# Patient Record
Sex: FEMALE | Race: WHITE | NOT HISPANIC OR LATINO | Employment: FULL TIME | URBAN - METROPOLITAN AREA
[De-identification: names, ages, dates, MRNs, and addresses within clinical notes are randomized per-mention and may not be internally consistent; named-entity substitution may affect disease eponyms.]

---

## 2019-02-15 ENCOUNTER — GYNECOLOGY VISIT (OUTPATIENT)
Dept: OBGYN | Facility: CLINIC | Age: 34
End: 2019-02-15
Payer: MEDICAID

## 2019-02-15 VITALS
DIASTOLIC BLOOD PRESSURE: 82 MMHG | HEIGHT: 62 IN | BODY MASS INDEX: 29.26 KG/M2 | SYSTOLIC BLOOD PRESSURE: 128 MMHG | WEIGHT: 159 LBS

## 2019-02-15 DIAGNOSIS — Z01.419 WELL WOMAN EXAM: ICD-10-CM

## 2019-02-15 PROCEDURE — 99204 OFFICE O/P NEW MOD 45 MIN: CPT | Performed by: NURSE PRACTITIONER

## 2019-02-15 RX ORDER — MEDROXYPROGESTERONE ACETATE 150 MG/ML
150 INJECTION, SUSPENSION INTRAMUSCULAR ONCE
Qty: 1 VIAL | Refills: 0 | Status: SHIPPED | OUTPATIENT
Start: 2019-02-15 | End: 2019-02-15

## 2019-02-15 ASSESSMENT — ENCOUNTER SYMPTOMS
CONSTITUTIONAL NEGATIVE: 1
PSYCHIATRIC NEGATIVE: 1
MUSCULOSKELETAL NEGATIVE: 1
RESPIRATORY NEGATIVE: 1
CARDIOVASCULAR NEGATIVE: 1
GASTROINTESTINAL NEGATIVE: 1
NEUROLOGICAL NEGATIVE: 1
EYES NEGATIVE: 1

## 2019-02-15 NOTE — PROGRESS NOTES
Subjective:      Taylor Hermosillo is a 33 y.o. female who presents with Other (Consult for MIrena IUD removal & Placement)            Taylor Hermosillo is a 33 y.o. female who presents for her Gynecologic Exam.         HPI Comments: Pt presents for well woman exam. Pt has no complaints except some weight loss in past few months that she is unsure is related to her thyroid imbalance hx or her commuting on a bicycle. Last LMP several years ago, just random spotting. Is using Mirena IUD and reports it  this past month 19 as per the provider who placed it. She reports she would like it removed and another placed. She reports she had sex a few times unprotected in that first week of February but has since used condoms.     Review of Systems   Pertinent positives documented in HPI and all other systems reviewed & are negative    All PMH, PSH, allergies, social history and FH reviewed and updated today:  Reports hx of hashimoto's on levothyroxine but has discontinued use about 6 months ago because she was biking a lot as form of transportation and started losing too much weight and the medication was making her nauseous. Reports hx of losartan due to high blood pressure that happened due to her thyroid issues, as per patient.    Dental extraction and  deliveries x2  No allergies to medications, foods, environment.   Reports no alcohol use, no smoking cigarettes, no drug use. Reports one sexual male partner currently. Approximately 20 sexual partners in the past, all males.   Reports family hx of high blood pressure from father and maternal grandmother; kidney disease on maternal grandfather; thyroid issues with maternal grandmother and grandfather; reports mother has bleeding tendencies, and maternal grandfather has heart disease. Dementia and alzheimers also with mother and grandmother.   Medications: none       Objective:  Vital measurements:  128/82  159 lbs    Physical Exam     Chaperone offered:  declines     Nursing note and vitals reviewed.  Constitutional: She is oriented to person, place, and time. She appears well-developed and well-nourished. No distress.     HEENT:   Head: Normocephalic and atraumatic.   Right Ear: External ear normal.   Left Ear: External ear normal.   Nose: Nose normal.   Eyes: Conjunctivae and EOM are normal. Pupils are equal, round, and reactive to light. No scleral icterus.     Neck: Normal range of motion. Neck supple. No tracheal deviation present. No thyromegaly present.     Pulmonary/Chest: Effort normal and breath sounds normal. No respiratory distress. She has no wheezes. She has no rales. She exhibits no tenderness.     Cardiovascular: Regular, rate and rhythm. No JVD.    Abdominal: Soft. Bowel sounds are normal. She exhibits no distension and no mass. No tenderness. She has no rebound and no guarding.     Breast:  Symmetrical, normal consistency without masses., No dimpling or skin changes, negative, No masses    Genitourinary:  Pelvic exam was performed with patient supine.  External genitalia with no abnormal pigmentation, labial fusion,rash, tenderness, lesion or injury to the labia bilaterally.  Vagina is moist with no lesions, foul discharge, erythema, tenderness or bleeding. No foreign body around the vagina or signs of injury.   Cervix exhibits no motion tenderness, no discharge and no friability.   Uterus is normal not deviated, not enlarged, not fixed and not tender.  Right adnexum displays no mass, no tenderness and no fullness. Left adnexum displays no mass, no tenderness and no fullness.     Musculoskeletal: Normal range of motion. She exhibits no edema and no tenderness.     Lymphadenopathy: She has no cervical adenopathy.     Neurological: She is alert and oriented to person, place, and time. She exhibits normal muscle tone.     Skin: Skin is warm and dry. No rash noted. She is not diaphoretic. No erythema. No pallor.     Psychiatric: She has a normal mood  "and affect. Her behavior is normal. Judgment and thought content normal.               Assessment:    Reassuring well woman exam       Plan:  Pap and physical exam performed  Mirena ordered; consulted on risks and benefits  Depo in meantime as mirena has now ; educated on back up method in first week of use  TSH and Free T ordered  Recommend pregnancy test before IUD removal/insertion due to  timeframe and unprotected sex   Monthly SBE  Counseling: breast self exam and family planning choices  Encourage exercise and proper diet.  Mammograms starting @ age 40 annually.  See medications and orders placed in encounter report.  Return for Mirena removal and insertion         Review of Systems   Constitutional: Negative.    HENT: Negative.    Eyes: Negative.    Respiratory: Negative.    Cardiovascular: Negative.    Gastrointestinal: Negative.    Genitourinary: Negative.    Musculoskeletal: Negative.    Skin: Negative.    Neurological: Negative.    Endo/Heme/Allergies: Negative.    Psychiatric/Behavioral: Negative.           Objective:     /82 (BP Location: Left arm, Patient Position: Sitting)   Ht 1.562 m (5' 1.5\")   Wt 72.1 kg (159 lb)   BMI 29.56 kg/m²      Physical Exam   Constitutional: She is oriented to person, place, and time. She appears well-developed and well-nourished.   HENT:   Head: Normocephalic and atraumatic.   Eyes: Pupils are equal, round, and reactive to light.   Neck: Normal range of motion. Neck supple. No thyromegaly present.   Cardiovascular: Normal rate and regular rhythm.    Pulmonary/Chest: Effort normal and breath sounds normal.   Abdominal: Soft. Hernia confirmed negative in the right inguinal area and confirmed negative in the left inguinal area.   Genitourinary: Vagina normal and uterus normal. No labial fusion. There is no rash, tenderness, lesion or injury on the right labia. There is no rash, tenderness, lesion or injury on the left labia.   Musculoskeletal: Normal " range of motion.   Lymphadenopathy:        Right: No inguinal adenopathy present.        Left: No inguinal adenopathy present.   Neurological: She is alert and oriented to person, place, and time.   Skin: Skin is warm and dry.   Psychiatric: She has a normal mood and affect. Her behavior is normal. Judgment and thought content normal.               Assessment/Plan:     1. Well woman exam    - THINPREP RFLX HPV ASCUS W/CTNG; Future  - TSH; Future  - FREE THYROXINE; Future  - REFERRAL TO GYNECOLOGY

## 2019-02-15 NOTE — PROGRESS NOTES
GYN visit: consult for Mirena IUD removal & placement  LMP: spotting 5-6 months.  Last pap: normal per patient done 2 yrs ago  WT: 159 lb  BP: 128/82  Good # [phone]

## 2019-03-07 ENCOUNTER — NON-PROVIDER VISIT (OUTPATIENT)
Dept: OBGYN | Facility: CLINIC | Age: 34
End: 2019-03-07
Payer: MEDICAID

## 2019-03-07 DIAGNOSIS — Z30.8 ENCOUNTER FOR OTHER CONTRACEPTIVE MANAGEMENT: ICD-10-CM

## 2019-03-07 LAB
INT CON NEG: NEGATIVE
INT CON POS: POSITIVE
POC URINE PREGNANCY TEST: NEGATIVE

## 2019-03-07 PROCEDURE — 96372 THER/PROPH/DIAG INJ SC/IM: CPT | Performed by: OBSTETRICS & GYNECOLOGY

## 2019-03-07 PROCEDURE — 81025 URINE PREGNANCY TEST: CPT | Performed by: OBSTETRICS & GYNECOLOGY

## 2019-03-08 NOTE — NON-PROVIDER
Pt here for Depo injection. Pt brought in own medication.   NDC:  [id number]  LOT#: [id number]  Expiration Date: 2021   Dose: 150 mg/ml  Site: R Upper Outer Quadrant Gluteal   Patient educated on use and side effects of medication. Name and  verified prior to injection. Pt tolerated? Well   Administered by Aortiz   Verified by Kezia Leal   Next due 2019-2019   UPT Negative

## 2019-03-19 ENCOUNTER — GYNECOLOGY VISIT (OUTPATIENT)
Dept: OBGYN | Facility: CLINIC | Age: 34
End: 2019-03-19
Payer: MEDICAID

## 2019-03-19 VITALS
HEIGHT: 61 IN | DIASTOLIC BLOOD PRESSURE: 60 MMHG | WEIGHT: 159 LBS | SYSTOLIC BLOOD PRESSURE: 120 MMHG | BODY MASS INDEX: 30.02 KG/M2

## 2019-03-19 DIAGNOSIS — Z30.433 ENCOUNTER FOR REMOVAL AND REINSERTION OF IUD: Primary | ICD-10-CM

## 2019-03-19 PROCEDURE — 58301 REMOVE INTRAUTERINE DEVICE: CPT | Performed by: NURSE PRACTITIONER

## 2019-03-19 PROCEDURE — 58300 INSERT INTRAUTERINE DEVICE: CPT | Performed by: NURSE PRACTITIONER

## 2019-03-19 ASSESSMENT — ENCOUNTER SYMPTOMS
MUSCULOSKELETAL NEGATIVE: 1
GASTROINTESTINAL NEGATIVE: 1
CARDIOVASCULAR NEGATIVE: 1
CONSTITUTIONAL NEGATIVE: 1
RESPIRATORY NEGATIVE: 1
PSYCHIATRIC NEGATIVE: 1
EYES NEGATIVE: 1
NEUROLOGICAL NEGATIVE: 1

## 2019-03-19 NOTE — PROGRESS NOTES
Subjective:      Taylor Hermosillo is a 33 y.o. female who presents with Procedure (miren removal and re-insertion)    Taylor presents today for Mirena removal and replacement. She has had her other Mirena since 1/2014. She knew she was a little late for removal, so she has since had a Depo-Provera shot on 3/7/19 for birth control. She is here today to have a new Mirena placed.    IUD: Mirena is choice:    Today the patient is counseled on the risks of IUD insertion. Specifically discussed were alternative forms of birth control. I also discussed with the patient the risk of infection on insertion, and had asked the patient to remain on pelvic rest for one week following the insertion. We also discussed the risk of IUD expulsion, the risk of uterine perforation and IUD migration. If the IUD does migrate the patient may require a separate procedure such as a laparoscopy to retrieve the migrated IUD. I also discussed the 1% risk of pregnancy with IUD use. I also discussed the side effects of Mirena which can be amenorrhea or dysfunctional uterine bleeding or spotting.  Patient had the opportunity to ask questions regarding insertion, risks and benefits, all questions are answered in their entirety.  Informed consent is signed    Procedure note  Urine pregnancy test not done due to Mirena currently in place as well as back up form in place, informed consent was previously signed  The bimanual exam is performed the uterus is noted to be 8 weeks in size and is mid position  A speculum was inserted into the vagina, the cervix was cleansed with Betadine swabs x3  Tenaculum was placed on the anterior lip of the cervix at 11:00 and 1:00   The uterus was sounded to 8 centimeters  The IUD is placed under sterile conditions: yes  The strings trimmed to approximately 3 cm  Tenaculum was removed from the cervix and hemostasis was achieved with pressure only  The patient tolerated the procedure well    Lot: [id number]  Exp:  "08/2021  Patient is asked to followup in 5-6 weeks for IUD check. The patient is asked to remain on pelvic rest for 2-3 days.  Use a backup method for 7 days, condoms. She is asked to return sooner than 5-6 weeks for heavy vaginal bleeding uncontrolled pain or fever                      Risks: Irregular bleeding, pain during and after insertion, migration of device, expulsion of device, pregnancy (ectopic is more common in women with IUD's).  Benefits: decreased bleeding and cramping, 99.2% effective contraception, good for 5 years, concealed method, well tolerated by most recipients.   Insertion risks are: infection and perforation of the uterus, rare but can happen. Watch for fever, foul smelling discharge, heavy bleeding and abd pain not controlled with Ibuprofen.     HPI    Review of Systems   Constitutional: Negative.    HENT: Negative.    Eyes: Negative.    Respiratory: Negative.    Cardiovascular: Negative.    Gastrointestinal: Negative.    Genitourinary: Negative.    Musculoskeletal: Negative.    Skin: Negative.    Neurological: Negative.    Endo/Heme/Allergies: Negative.    Psychiatric/Behavioral: Negative.    All other systems reviewed and are negative.         Objective:     /60 (BP Location: Left arm, Patient Position: Sitting)   Ht 1.549 m (5' 1\")   Wt 72.1 kg (159 lb)   BMI 30.04 kg/m²      Physical Exam   Constitutional: She is oriented to person, place, and time. She appears well-developed and well-nourished.   HENT:   Nose: Nose normal.   Eyes: Conjunctivae are normal.   Neck: Normal range of motion.   Cardiovascular: Normal rate, regular rhythm and normal heart sounds.    Pulmonary/Chest: Effort normal and breath sounds normal.   Genitourinary: Vagina normal and uterus normal.   Musculoskeletal: Normal range of motion.   Neurological: She is alert and oriented to person, place, and time.   Skin: Skin is warm and dry.   Psychiatric: She has a normal mood and affect. Her behavior is normal. " Judgment and thought content normal.   Nursing note and vitals reviewed.       Assessment/Plan:     1. Encounter for removal and reinsertion of IUD  Mirena replaced without any complications  - Consent for Surgery / Procedure

## 2020-08-20 ENCOUNTER — HOSPITAL ENCOUNTER (EMERGENCY)
Facility: MEDICAL CENTER | Age: 35
End: 2020-08-20
Attending: EMERGENCY MEDICINE
Payer: MEDICAID

## 2020-08-20 VITALS
DIASTOLIC BLOOD PRESSURE: 74 MMHG | HEART RATE: 81 BPM | BODY MASS INDEX: 31.97 KG/M2 | TEMPERATURE: 97.6 F | HEIGHT: 61 IN | WEIGHT: 169.31 LBS | OXYGEN SATURATION: 100 % | SYSTOLIC BLOOD PRESSURE: 114 MMHG | RESPIRATION RATE: 18 BRPM

## 2020-08-20 DIAGNOSIS — N89.8 VAGINAL DISCHARGE: ICD-10-CM

## 2020-08-20 DIAGNOSIS — A59.9 TRICHOMONAS INFECTION: ICD-10-CM

## 2020-08-20 DIAGNOSIS — R10.2 VAGINAL PAIN: ICD-10-CM

## 2020-08-20 LAB
APPEARANCE UR: ABNORMAL
APPEARANCE UR: CLEAR
BACTERIA #/AREA URNS HPF: ABNORMAL /HPF
BACTERIA GENITAL QL WET PREP: NORMAL
BILIRUB UR QL STRIP.AUTO: NEGATIVE
COLOR UR: YELLOW
COLOR UR: YELLOW
EPI CELLS #/AREA URNS HPF: ABNORMAL /HPF
GLUCOSE UR STRIP.AUTO-MCNC: NEGATIVE MG/DL
GLUCOSE UR STRIP.AUTO-MCNC: NEGATIVE MG/DL
HCG UR QL: NEGATIVE
KETONES UR STRIP.AUTO-MCNC: NEGATIVE MG/DL
KETONES UR STRIP.AUTO-MCNC: NEGATIVE MG/DL
LEUKOCYTE ESTERASE UR QL STRIP.AUTO: ABNORMAL
LEUKOCYTE ESTERASE UR QL STRIP.AUTO: ABNORMAL
MICRO URNS: ABNORMAL
MUCOUS THREADS #/AREA URNS HPF: ABNORMAL /HPF
NITRITE UR QL STRIP.AUTO: NEGATIVE
NITRITE UR QL STRIP.AUTO: NEGATIVE
PH UR STRIP.AUTO: 6 [PH] (ref 5–8)
PH UR STRIP.AUTO: 6 [PH] (ref 5–8)
PROT UR QL STRIP: NEGATIVE MG/DL
PROT UR QL STRIP: NEGATIVE MG/DL
RBC # URNS HPF: ABNORMAL /HPF
RBC UR QL AUTO: ABNORMAL
RBC UR QL AUTO: ABNORMAL
SIGNIFICANT IND 70042: NORMAL
SITE SITE: NORMAL
SOURCE SOURCE: NORMAL
SP GR UR REFRACTOMETRY: 1.03
SP GR UR STRIP.AUTO: >=1.03 (ref 1–1.03)
UNIDENT CRYS URNS QL MICRO: ABNORMAL /HPF
WBC #/AREA URNS HPF: ABNORMAL /HPF

## 2020-08-20 PROCEDURE — 700102 HCHG RX REV CODE 250 W/ 637 OVERRIDE(OP): Performed by: EMERGENCY MEDICINE

## 2020-08-20 PROCEDURE — 81025 URINE PREGNANCY TEST: CPT

## 2020-08-20 PROCEDURE — 87491 CHLMYD TRACH DNA AMP PROBE: CPT

## 2020-08-20 PROCEDURE — 87591 N.GONORRHOEAE DNA AMP PROB: CPT

## 2020-08-20 PROCEDURE — 99284 EMERGENCY DEPT VISIT MOD MDM: CPT

## 2020-08-20 PROCEDURE — 81002 URINALYSIS NONAUTO W/O SCOPE: CPT

## 2020-08-20 PROCEDURE — 81001 URINALYSIS AUTO W/SCOPE: CPT

## 2020-08-20 PROCEDURE — 87086 URINE CULTURE/COLONY COUNT: CPT

## 2020-08-20 PROCEDURE — 87186 SC STD MICRODIL/AGAR DIL: CPT

## 2020-08-20 PROCEDURE — 87077 CULTURE AEROBIC IDENTIFY: CPT

## 2020-08-20 PROCEDURE — A9270 NON-COVERED ITEM OR SERVICE: HCPCS | Performed by: EMERGENCY MEDICINE

## 2020-08-20 RX ORDER — ACETAMINOPHEN 325 MG/1
650 TABLET ORAL ONCE
Status: COMPLETED | OUTPATIENT
Start: 2020-08-20 | End: 2020-08-20

## 2020-08-20 RX ORDER — METRONIDAZOLE 500 MG/1
500 TABLET ORAL 3 TIMES DAILY
Qty: 21 TAB | Refills: 0 | Status: SHIPPED | OUTPATIENT
Start: 2020-08-20 | End: 2020-08-27

## 2020-08-20 RX ADMIN — ACETAMINOPHEN 650 MG: 325 TABLET, FILM COATED ORAL at 17:55

## 2020-08-20 NOTE — LETTER
8/26/2020               Taylor Hermosillo  [address]  [facility] [address]        Dear Taylor (MR#[id number])    This letter is sent in regards to your, recent visit to the [facility] Emergency Department on 8/20/2020.  During the visit, tests were performed to assist the physician in a medical diagnosis.  A review of those tests requires that we notify you of the following:    Your vaginal specimen was POSITIVE for a parasite called trichomonas. The antibiotic prescribed for you (metronidazole) should be active to treat this parasite. IT IS IMPORTANT THAT YOU CONTINUE TAKING YOUR ANTIBIOTIC UNTIL IT IS FINISHED.      Please feel free to contact me at the number below if you have any questions or concerns. Thank you for your cooperation in the matter.    Sincerely,  ED Culture Follow-Up Staff  Gustabo Cochran, PharmD,     [facility], Emergency Department  [address] [place] [address] [id number] [phone] (ED Culture Line)  [phone]

## 2020-08-20 NOTE — ED TRIAGE NOTES
"Chief Complaint   Patient presents with   • Vaginal Pain     started about one and a half months ago, feels as if \"everything is falling out\"   • Vaginal Discharge     /85   Pulse (!) 106   Temp 36.4 °C (97.6 °F) (Temporal)   Resp 18   Ht 1.549 m (5' 1\")   Wt 76.8 kg (169 lb 5 oz)   SpO2 98%   BMI 31.99 kg/m²     Covid Screen Negative.      "

## 2020-08-21 LAB
C TRACH DNA SPEC QL NAA+PROBE: NEGATIVE
N GONORRHOEA DNA SPEC QL NAA+PROBE: NEGATIVE
SPECIMEN SOURCE: NORMAL

## 2020-08-21 NOTE — ED PROVIDER NOTES
"ED Provider Note    CHIEF COMPLAINT  Chief Complaint   Patient presents with   • Vaginal Pain     started about one and a half months ago, feels as if \"everything is falling out\"   • Vaginal Discharge       HPI  Taylor Hermosillo is a 35 y.o. female who presents with vaginal pain and discharge which is been going on for the better part of a month.  Patient states she is sexually active with her .  Patient states she has had some mild frequency and dysuria.  Patient denies fever or chills.  She denies abdominal pain.    REVIEW OF SYSTEMS  See HPI for further details.  No cough or cold symptoms.  Positive discharge and dysuria.  All other systems are negative.    PAST MEDICAL HISTORY  Past Medical History:   Diagnosis Date   • Anxiety    • Depression     PP depression in 2008   • Hypertension     2017   • Kidney disease     stones in 2008 and 2010   • Thyroid disease     Dx  with HAshimotos thyroid in 2401-8603, stopped meds 6 mnths ago       FAMILY HISTORY  Family History   Problem Relation Age of Onset   • Alcohol/Drug Mother         alcoholic   • Cancer Father         remission of throat cancer for 4 yrs       SOCIAL HISTORY  Social History     Socioeconomic History   • Marital status: Single     Spouse name: Not on file   • Number of children: Not on file   • Years of education: Not on file   • Highest education level: Not on file   Occupational History   • Not on file   Social Needs   • Financial resource strain: Not on file   • Food insecurity     Worry: Not on file     Inability: Not on file   • Transportation needs     Medical: Not on file     Non-medical: Not on file   Tobacco Use   • Smoking status: Never Smoker   • Smokeless tobacco: Never Used   Substance and Sexual Activity   • Alcohol use: No   • Drug use: Yes   • Sexual activity: Yes     Partners: Male     Birth control/protection: I.U.D.     Comment: Needs removal was inserted on 01/28/2014   Lifestyle   • Physical activity     Days per week: " "Not on file     Minutes per session: Not on file   • Stress: Not on file   Relationships   • Social connections     Talks on phone: Not on file     Gets together: Not on file     Attends Mandaeism service: Not on file     Active member of club or organization: Not on file     Attends meetings of clubs or organizations: Not on file     Relationship status: Not on file   • Intimate partner violence     Fear of current or ex partner: Not on file     Emotionally abused: Not on file     Physically abused: Not on file     Forced sexual activity: Not on file   Other Topics Concern   • Not on file   Social History Narrative   • Not on file       SURGICAL HISTORY  Past Surgical History:   Procedure Laterality Date   • REPEAT C SECTION  11/30/2008    done at [facility]   • PRIMARY C SECTION  01/27/2005    done at [facility]   • DENTAL EXTRACTION(S)      01/2019       CURRENT MEDICATIONS  @ [employer] [place]@    ALLERGIES  No Known Allergies    PHYSICAL EXAM  VITAL SIGNS: /73   Pulse 62   Temp 36.4 °C (97.6 °F) (Temporal)   Resp 18   Ht 1.549 m (5' 1\")   Wt 76.8 kg (169 lb 5 oz)   SpO2 100%   BMI 31.99 kg/m²   Constitutional: Well developed, Well nourished, No acute distress, Non-toxic appearance.   HENT: Normocephalic, Atraumatic, Bilateral external ears normal, Oropharynx moist, No oral exudates, Nose normal.   Eyes: KEVYN, EOMI, Conjunctiva normal, No discharge.   Neck: Normal range of motion, No tenderness, Supple, No stridor. No nuchal rigidity  Lymphatic: No lymphadenopathy noted.   Cardiovascular: Normal heart rate, Normal rhythm, No murmurs, No rubs, No gallops.   Thorax & Lungs: Normal breath sounds, No respiratory distress,   Abdomen: Normal bowel sounds.  Soft.  Minimal suprapubic tenderness without signs of peritonitis  Pelvic exam: Minimal cervical motion discomfort  Musculoskeletal: No CVA tenderness  Skin: Warm, Dry, No erythema, No rash.   Back: No tenderness, No CVA " tenderness.   Extremities: No edema, No tenderness, No cyanosis, No clubbing. Dorsalis pedis pulses 2+ equal bilaterally. Radial pulses 2+ equal bilaterally    RADIOLOGY/PROCEDURES  Results for orders placed or performed during the hospital encounter of 08/20/20   WET PREP    Specimen: Vaginal; Genital   Result Value Ref Range    Significant Indicator NEG     Source GEN     Site VAGINAL     Wet Prep For Parasites       No yeast.  Moderate WBCs seen.  Few clue cells seen.  Few motile Trichomonas seen.  NOTE:  Excessive saline was placed in container so estimated  quantities are inaccurate.     CHLAMYDIA & GC BY PCR    Specimen: Genital   Result Value Ref Range    Source Vaginal    URINALYSIS,CULTURE IF INDICATED    Specimen: Urine   Result Value Ref Range    Color Yellow     Character Hazy (A)     Ph 6.0 5.0 - 8.0    Glucose Negative Negative mg/dL    Ketones Negative Negative mg/dL    Protein Negative Negative mg/dL    Bilirubin Negative Negative    Nitrite Negative Negative    Leukocyte Esterase Moderate (A) Negative    Occult Blood Trace (A) Negative    Micro Urine Req Microscopic    REFRACTOMETER SG   Result Value Ref Range    Specific Gravity 1.026    URINE MICROSCOPIC (W/UA)   Result Value Ref Range    WBC 20-50 (A) /hpf    RBC 2-5 (A) /hpf    Bacteria Moderate (A) None /hpf    Epithelial Cells Moderate (A) Few /hpf    Mucous Threads Moderate /hpf    Urine Crystals Few Amorphous /hpf   POC UA   Result Value Ref Range    POC Color Yellow     POC Appearance Clear     POC Glucose Negative Negative mg/dL    POC Ketones Negative Negative mg/dL    POC Specific Gravity >=1.030 (A) 1.005 - 1.030    POC Blood Trace-lysed (A) Negative    POC Urine PH 6.0 5.0 - 8.0    POC Protein Negative Negative mg/dL    POC Nitrites Negative Negative    POC Leukocyte Esterase Small (A) Negative   POC URINE PREGNANCY   Result Value Ref Range    POC Urine Pregnancy Test Negative          COURSE & MEDICAL DECISION MAKING  Pertinent Labs &  Imaging studies reviewed. (See chart for details)  Differential diagnosis includes sexually transmitted illness, UTI, ectopic pregnancy.    hCG is negative.  Urinalysis appeared to be contaminated with quite a few epithelial cells.  Wet prep showed Trichomonas as well as clue cells and numerous white cells.  Patient will be started on Flagyl.  Patient was informed that she needed to be treated and her partner would also need to be treated before they could be sexually active.    Patient was discharged home in stable condition    FINAL IMPRESSION  1.  Trichomonas  2.  Vaginitis  3  Abdominal pain      Please note that this dictation was created using voice recognition software. I have worked with consultants from the vendor as well as technical experts from [facility] to optimize the interface. I have made every reasonable attempt to correct obvious errors, but I expect that there are errors of grammar and possibly content that I did not discover before finalizing the note.    Electronically signed by: Joe Gutierrez M.D., 8/20/2020 7:31 PM

## 2020-08-21 NOTE — ED NOTES
Dc instructions reviewed with pt aware of need to  prescription at [facility] on [facility], no sex until 7 days AFTER completion of antix and recommended that partner be tested for std . To f/o with [facility] neelam, return for worsening s/s

## 2020-08-21 NOTE — ED NOTES
Med for 8/10 lower abd pain after pelvic exam, call light in reach, denies further needs at this time

## 2020-08-23 LAB
BACTERIA UR CULT: ABNORMAL
BACTERIA UR CULT: ABNORMAL
SIGNIFICANT IND 70042: ABNORMAL
SITE SITE: ABNORMAL
SOURCE SOURCE: ABNORMAL

## 2020-08-26 NOTE — ED NOTES
ED Positive Culture Follow-up/Notification Note:    Date: 08/26/20     Patient seen in the ED on 8/20/2020 for presented with vaginal pain and discharge x 1 month. Patient does have some mild frequency and dysuria. Patient denies fever or chills.  She denies abdominal pain. Wet prep showed Trichomonas as well as clue cells and numerous white cells. Patient is not pregnant and Urinalysis appeared to be contaminated with quite a few epithelial cells.  1. Vaginal discharge    2. Vaginal pain    3. Trichomonas infection       Discharge Medication List as of 8/20/2020  7:34 PM      START taking these medications    Details   metroNIDAZOLE (FLAGYL) 500 MG Tab Take 1 Tab by mouth 3 times a day for 7 days., Disp-21 Tab,R-0, Normal             Allergies: Patient has no known allergies.     Vitals:    08/20/20 1656 08/20/20 1726 08/20/20 1826 08/20/20 1926   BP: 124/84 (!) 99/62 105/73 114/74   Pulse: 85 73 62 81   Resp:       Temp:       TempSrc:       SpO2: 100% 100% 100% 100%   Weight:       Height:           Final cultures:   Results     Procedure Component Value Units Date/Time    URINE CULTURE(NEW) [[id number]]  (Abnormal)  (Susceptibility) Collected: 08/20/20 1735    Order Status: Completed Specimen: Urine Updated: 08/23/20 1003     Significant Indicator POS     Source UR     Site -     Culture Result -      Staphylococcus epidermidis  10-50,000 cfu/mL      Susceptibility     Staphylococcus epidermidis (1)     Antibiotic Interpretation Microscan Method Status    Daptomycin Sensitive <=1 mcg/mL EWELINA Final    Nitrofurantoin Sensitive <=32 mcg/mL EWELINA Final    Cefazolin Resistant <=8 mcg/mL EWELINA Final    Penicillin Resistant 8 mcg/mL EWELINA Final    Tetracycline Sensitive <=4 mcg/mL EWELINA Final    Trimeth/Sulfa Resistant >2/38 mcg/mL EWELINA Final                   CHLAMYDIA & GC BY PCR [[id number]] Collected: 08/20/20 1739    Order Status: Completed Specimen: Genital Updated: 08/21/20 2234     C. trachomatis by PCR Negative     N.  gonorrhoeae by PCR Negative     Source Vaginal    Narrative:      Excessive saline put in wet prep.  Polina RN requested testing be  performed anyway.  08/20/2020  17:55 hilario    WET PREP [[id number]] Collected: 08/20/20 1739    Order Status: Completed Specimen: Genital from Vaginal Updated: 08/20/20 1814     Significant Indicator NEG     Source GEN     Site VAGINAL     Wet Prep For Parasites No yeast.  Moderate WBCs seen.  Few clue cells seen.  Few motile Trichomonas seen.  NOTE:  Excessive saline was placed in container so estimated  quantities are inaccurate.      URINALYSIS,CULTURE IF INDICATED [[id number]]  (Abnormal) Collected: 08/20/20 1735    Order Status: Completed Specimen: Urine Updated: 08/20/20 1805     Color Yellow     Character Hazy     Ph 6.0     Glucose Negative mg/dL      Ketones Negative mg/dL      Protein Negative mg/dL      Bilirubin Negative     Nitrite Negative     Leukocyte Esterase Moderate     Occult Blood Trace     Micro Urine Req Microscopic    URINE CULTURE(NEW) [[id number]]     Order Status: Canceled           Plan:   Staph epidermidis in urine is likely a contaminant, metronidazole is appropriate for trichomonas.  Attempted to contact patient to decrease dosing to BID from TID per CDC guidelines. Patient did not answer phone, left message.   Sent letter to patient to notify of positive culture result and encourage compliance with prescribed antibiotics.     Gustabo Cochran, PharmD

## 2023-03-26 ENCOUNTER — HOSPITAL ENCOUNTER (EMERGENCY)
Facility: MEDICAL CENTER | Age: 38
End: 2023-03-26
Attending: EMERGENCY MEDICINE
Payer: COMMERCIAL

## 2023-03-26 ENCOUNTER — APPOINTMENT (OUTPATIENT)
Dept: RADIOLOGY | Facility: MEDICAL CENTER | Age: 38
End: 2023-03-26
Attending: EMERGENCY MEDICINE
Payer: COMMERCIAL

## 2023-03-26 VITALS
RESPIRATION RATE: 18 BRPM | HEART RATE: 89 BPM | DIASTOLIC BLOOD PRESSURE: 81 MMHG | BODY MASS INDEX: 33.96 KG/M2 | OXYGEN SATURATION: 97 % | HEIGHT: 62 IN | TEMPERATURE: 98.1 F | SYSTOLIC BLOOD PRESSURE: 125 MMHG | WEIGHT: 184.53 LBS

## 2023-03-26 DIAGNOSIS — N89.8 VAGINAL DISCHARGE: ICD-10-CM

## 2023-03-26 DIAGNOSIS — N76.0 BACTERIAL VAGINOSIS: ICD-10-CM

## 2023-03-26 DIAGNOSIS — A59.9 TRICHOMONIASIS: ICD-10-CM

## 2023-03-26 DIAGNOSIS — B96.89 BACTERIAL VAGINOSIS: ICD-10-CM

## 2023-03-26 LAB
ALBUMIN SERPL BCP-MCNC: 3.6 G/DL (ref 3.2–4.9)
ALBUMIN/GLOB SERPL: 1.1 G/DL
ALP SERPL-CCNC: 105 U/L (ref 30–99)
ALT SERPL-CCNC: 13 U/L (ref 2–50)
ANION GAP SERPL CALC-SCNC: 11 MMOL/L (ref 7–16)
APPEARANCE UR: ABNORMAL
AST SERPL-CCNC: 20 U/L (ref 12–45)
BACTERIA #/AREA URNS HPF: ABNORMAL /HPF
BACTERIA GENITAL QL WET PREP: NORMAL
BASOPHILS # BLD AUTO: 0.6 % (ref 0–1.8)
BASOPHILS # BLD: 0.05 K/UL (ref 0–0.12)
BILIRUB SERPL-MCNC: 0.3 MG/DL (ref 0.1–1.5)
BILIRUB UR QL STRIP.AUTO: NEGATIVE
BUN SERPL-MCNC: 11 MG/DL (ref 8–22)
CALCIUM ALBUM COR SERPL-MCNC: 8.5 MG/DL (ref 8.5–10.5)
CALCIUM SERPL-MCNC: 8.2 MG/DL (ref 8.4–10.2)
CHLORIDE SERPL-SCNC: 105 MMOL/L (ref 96–112)
CO2 SERPL-SCNC: 23 MMOL/L (ref 20–33)
COLOR UR: YELLOW
CREAT SERPL-MCNC: 0.57 MG/DL (ref 0.5–1.4)
EOSINOPHIL # BLD AUTO: 0.15 K/UL (ref 0–0.51)
EOSINOPHIL NFR BLD: 1.8 % (ref 0–6.9)
EPI CELLS #/AREA URNS HPF: ABNORMAL /HPF
ERYTHROCYTE [DISTWIDTH] IN BLOOD BY AUTOMATED COUNT: 42.6 FL (ref 35.9–50)
GFR SERPLBLD CREATININE-BSD FMLA CKD-EPI: 118 ML/MIN/1.73 M 2
GLOBULIN SER CALC-MCNC: 3.2 G/DL (ref 1.9–3.5)
GLUCOSE SERPL-MCNC: 98 MG/DL (ref 65–99)
GLUCOSE UR STRIP.AUTO-MCNC: NEGATIVE MG/DL
HCG UR QL: NEGATIVE
HCT VFR BLD AUTO: 40.3 % (ref 37–47)
HGB BLD-MCNC: 13.3 G/DL (ref 12–16)
IMM GRANULOCYTES # BLD AUTO: 0.04 K/UL (ref 0–0.11)
IMM GRANULOCYTES NFR BLD AUTO: 0.5 % (ref 0–0.9)
KETONES UR STRIP.AUTO-MCNC: NEGATIVE MG/DL
LEUKOCYTE ESTERASE UR QL STRIP.AUTO: ABNORMAL
LIPASE SERPL-CCNC: 36 U/L (ref 7–58)
LYMPHOCYTES # BLD AUTO: 2.05 K/UL (ref 1–4.8)
LYMPHOCYTES NFR BLD: 24.9 % (ref 22–41)
MCH RBC QN AUTO: 28.7 PG (ref 27–33)
MCHC RBC AUTO-ENTMCNC: 33 G/DL (ref 33.6–35)
MCV RBC AUTO: 87 FL (ref 81.4–97.8)
MICRO URNS: ABNORMAL
MONOCYTES # BLD AUTO: 1 K/UL (ref 0–0.85)
MONOCYTES NFR BLD AUTO: 12.1 % (ref 0–13.4)
NEUTROPHILS # BLD AUTO: 4.95 K/UL (ref 2–7.15)
NEUTROPHILS NFR BLD: 60.1 % (ref 44–72)
NITRITE UR QL STRIP.AUTO: NEGATIVE
NRBC # BLD AUTO: 0 K/UL
NRBC BLD-RTO: 0 /100 WBC
PH UR STRIP.AUTO: 7 [PH] (ref 5–8)
PLATELET # BLD AUTO: 308 K/UL (ref 164–446)
PMV BLD AUTO: 9.3 FL (ref 9–12.9)
POTASSIUM SERPL-SCNC: 4.5 MMOL/L (ref 3.6–5.5)
PROT SERPL-MCNC: 6.8 G/DL (ref 6–8.2)
PROT UR QL STRIP: NEGATIVE MG/DL
RBC # BLD AUTO: 4.63 M/UL (ref 4.2–5.4)
RBC # URNS HPF: ABNORMAL /HPF
RBC UR QL AUTO: ABNORMAL
SIGNIFICANT IND 70042: NORMAL
SITE SITE: NORMAL
SODIUM SERPL-SCNC: 139 MMOL/L (ref 135–145)
SOURCE SOURCE: NORMAL
SP GR UR STRIP.AUTO: 1.02
WBC # BLD AUTO: 8.2 K/UL (ref 4.8–10.8)
WBC #/AREA URNS HPF: ABNORMAL /HPF

## 2023-03-26 PROCEDURE — 700105 HCHG RX REV CODE 258: Performed by: EMERGENCY MEDICINE

## 2023-03-26 PROCEDURE — 700111 HCHG RX REV CODE 636 W/ 250 OVERRIDE (IP): Performed by: EMERGENCY MEDICINE

## 2023-03-26 PROCEDURE — 80053 COMPREHEN METABOLIC PANEL: CPT

## 2023-03-26 PROCEDURE — 83690 ASSAY OF LIPASE: CPT

## 2023-03-26 PROCEDURE — 36415 COLL VENOUS BLD VENIPUNCTURE: CPT

## 2023-03-26 PROCEDURE — 76856 US EXAM PELVIC COMPLETE: CPT

## 2023-03-26 PROCEDURE — 86780 TREPONEMA PALLIDUM: CPT

## 2023-03-26 PROCEDURE — 85025 COMPLETE CBC W/AUTO DIFF WBC: CPT

## 2023-03-26 PROCEDURE — 99285 EMERGENCY DEPT VISIT HI MDM: CPT

## 2023-03-26 PROCEDURE — 81001 URINALYSIS AUTO W/SCOPE: CPT

## 2023-03-26 PROCEDURE — 87491 CHLMYD TRACH DNA AMP PROBE: CPT

## 2023-03-26 PROCEDURE — 87591 N.GONORRHOEAE DNA AMP PROB: CPT

## 2023-03-26 PROCEDURE — 87086 URINE CULTURE/COLONY COUNT: CPT

## 2023-03-26 PROCEDURE — 81025 URINE PREGNANCY TEST: CPT

## 2023-03-26 PROCEDURE — 96375 TX/PRO/DX INJ NEW DRUG ADDON: CPT

## 2023-03-26 PROCEDURE — 96374 THER/PROPH/DIAG INJ IV PUSH: CPT

## 2023-03-26 RX ORDER — METRONIDAZOLE 500 MG/1
500 TABLET ORAL 3 TIMES DAILY
Qty: 21 TABLET | Refills: 0 | Status: SHIPPED | OUTPATIENT
Start: 2023-03-26 | End: 2023-04-02

## 2023-03-26 RX ORDER — MORPHINE SULFATE 4 MG/ML
4 INJECTION INTRAVENOUS ONCE
Status: COMPLETED | OUTPATIENT
Start: 2023-03-26 | End: 2023-03-26

## 2023-03-26 RX ORDER — ONDANSETRON 2 MG/ML
4 INJECTION INTRAMUSCULAR; INTRAVENOUS ONCE
Status: COMPLETED | OUTPATIENT
Start: 2023-03-26 | End: 2023-03-26

## 2023-03-26 RX ORDER — SODIUM CHLORIDE, SODIUM LACTATE, POTASSIUM CHLORIDE, CALCIUM CHLORIDE 600; 310; 30; 20 MG/100ML; MG/100ML; MG/100ML; MG/100ML
1000 INJECTION, SOLUTION INTRAVENOUS ONCE
Status: COMPLETED | OUTPATIENT
Start: 2023-03-26 | End: 2023-03-26

## 2023-03-26 RX ADMIN — ONDANSETRON 4 MG: 2 INJECTION INTRAMUSCULAR; INTRAVENOUS at 19:01

## 2023-03-26 RX ADMIN — MORPHINE SULFATE 4 MG: 4 INJECTION INTRAVENOUS at 19:01

## 2023-03-26 RX ADMIN — SODIUM CHLORIDE, POTASSIUM CHLORIDE, SODIUM LACTATE AND CALCIUM CHLORIDE 1000 ML: 600; 310; 30; 20 INJECTION, SOLUTION INTRAVENOUS at 19:07

## 2023-03-26 ASSESSMENT — PAIN DESCRIPTION - PAIN TYPE: TYPE: ACUTE PAIN

## 2023-03-27 LAB
C TRACH DNA GENITAL QL NAA+PROBE: NEGATIVE
N GONORRHOEA DNA GENITAL QL NAA+PROBE: NEGATIVE
SPECIMEN SOURCE: NORMAL
T PALLIDUM AB SER QL IA: NORMAL

## 2023-03-27 NOTE — ED NOTES
Pt. Verbalizes understanding of discharge instructions.  Pt. Alert/awake in NAD.   All questions answered and understood. Advised to ff-up with PCP.

## 2023-03-27 NOTE — ED TRIAGE NOTES
Patient presents with complaints of a 3 week history of dysuria and vaginal discharge. Describes and burning and pressure. Denies any fever or chills.

## 2023-03-27 NOTE — ED NOTES
Patient provided with specimen container and instructed on clean catch technique  Patient verbalized understanding  Patient to bathroom to attempt to provide urine sample

## 2023-03-27 NOTE — ED PROVIDER NOTES
"ED Provider Note    CHIEF COMPLAINT  Chief Complaint   Patient presents with    Painful Urination     X 3 weeks    Vaginal Discharge     Reports noting discharge        EXTERNAL RECORDS REVIEWED  Outpatient Notes      HPI/ROS  LIMITATION TO HISTORY   Select: : None    Taylor Hermosillo is a 37 y.o. female who presents for evaluation of vaginal discharge.  The patient states over the last 3 weeks she has been having a malodorous creamy discharge.  She has been having dyspareunia associated with this.  She is also had some mild dysuria.  Patient is G2, P2 Ab0.  She states she has not IUD in place has been there for several years.  The patient denies: Fever, chills, URI symptoms, cardiorespiratory symptoms, gastrointestinal symptoms.  No other acute symptomatology or complaints.    PAST MEDICAL HISTORY   has a past medical history of Anxiety, Depression, Hypertension, Kidney disease, and Thyroid disease.    SURGICAL HISTORY   has a past surgical history that includes dental extraction(s); primary c section (01/27/2005); and repeat c section (11/30/2008).    FAMILY HISTORY  Family History   Problem Relation Age of Onset    Alcohol/Drug Mother         alcoholic    Cancer Father         remission of throat cancer for 4 yrs       SOCIAL HISTORY  Social History     Tobacco Use    Smoking status: Never    Smokeless tobacco: Never   Substance and Sexual Activity    Alcohol use: No    Drug use: Yes    Sexual activity: Yes     Partners: Male     Birth control/protection: I.U.D.     Comment: Needs removal was inserted on 01/28/2014       CURRENT MEDICATIONS  See nurses notes      ALLERGIES  Allergies   Allergen Reactions    Hydrocodone-Acetaminophen Itching       PHYSICAL EXAM  VITAL SIGNS: BP (!) 151/96   Pulse (!) 104   Temp 36.4 °C (97.6 °F) (Temporal)   Resp 16   Ht 1.562 m (5' 1.5\")   Wt 83.7 kg (184 lb 8.4 oz)   SpO2 98%   BMI 34.30 kg/m²    Constitutional: 37-year-old female, well developed, Well nourished, No " acute distress, Non-toxic appearance.   HENT: Normocephalic, Atraumatic, Nares:Clear, Oropharynx: moist, well hydrated, posterior pharynx:clear   Eyes: PERRL, EOMI, Conjunctiva normal, No discharge.   Neck: Normal range of motion, No tenderness, Supple, No stridor.   Lymphatic: No lymphadenopathy noted.   Cardiovascular: Regular rate and rhythm without mumurs, gallups, rubs   Thorax & Lungs: Normal Equal breath sounds, No respiratory distress, No wheezing, no stridor, no rales. No chest tenderness.   Abdomen: Soft, nontender, nondistended, no organomegaly, positive bowel sounds normal in quality. No guarding or rebound.    Pelvic examination form with the female chaperone at the bedside: Normal external genitalia; yellow to greenish discharge in the vaginal vault with some inflammation of the vaginal wall; inflammation of the cervix some mild cervical motion tenderness; bilateral adnexal tenderness but no uterine enlargement;    Skin: Good skin turgor, pink, warm, dry. No rashes, petechiae, purpura. Normal capillary refill.   Back: No tenderness, No CVA tenderness.   Extremities: Intact distal pulses, No edema, No tenderness, No cyanosis,  Vascular: Pulses are 2+, symmetric in the upper and lower extremities.    Neurologic: Alert & oriented x 3,  No gross focal deficits noted.   Psychiatric: Affect normal, Judgment normal, Mood normal.       DIAGNOSTIC STUDIES / PROCEDURES    LABS  CBC shows white count of 8.2, 6% neutrophils, 24% lymphocytes, 12% monocytes, hemoglobin 13.3, crit 40.3; CMP shows calcium of 8.2, alkaline phosphatase 105 otherwise within normal; lipase 36; urinalysis is positive for large leukocyte Estrace, WBCs 10-20, RBCs 5-10, epithelial cells few, bacteria few; urine hCG is negative; wet prep was positive for trichomoniasis; clue cells and white cells identified; no yeast identified;    RADIOLOGY  Radiologist interpretation:   US-PELVIC COMPLETE (TRANSABDOMINAL/TRANSVAGINAL) (COMBO)   Final Result          1.  Thickened endometrial stripe, could represent proliferative changes given patient age. Consider endometrial pathology with additional workup as clinically appropriate.   2.  Thick-walled cystic structure in the right ovary, correlate with negative beta-hCG to definitively exclude ectopic pregnancy. Follow-up evaluation in 6 weeks for repeat characterization.   3.  Nabothian cysts   4.  IUD in place.            COURSE & MEDICAL DECISION MAKING        INITIAL ASSESSMENT, COURSE AND PLAN  Care Narrative: At this time, the patient presents for evaluation of vaginal discharge.  The patient has findings consistent with trichomoniasis and bacterial vaginosis.  Patient clinically does not have PID.  She does have an IUD in place but I do not feel it needs to be removed at this time based on her clinical findings.  Her white count and ultrasound are reassuring with no evidence of significant inflammatory changes.  At this time, the patient is stable for discharge.  I have discussed the findings and treatment plan with the patient.  She indicates that she is comfortable with this explanation disposition.        1.  Monitor  2.  IV lactated Ringer's  3.  Zofran 4 mg IV  4.  Morphine 4 mg IV    ADDITIONAL PROBLEM LIST    DISPOSITION AND DISCUSSIONS    Escalation of care considered, and ultimately not performed:acute inpatient care management, however at this time, the patient is most appropriate for outpatient management      Decision tools and prescription drugs considered including, but not limited to: Antibiotics Flagyl and Pain Medications Tylenol and/or Profen should suffice for pain .    PLAN:  1.  Appropriate discharge instructions given  2.  Flagyl 500 mg 3 times daily x7 days  3.  Follow-up with primary care    FINAL DIAGNOSIS  1. Vaginal discharge    2. Bacterial vaginosis    3. Trichomoniasis             Electronically signed by: Guy G Gansert, M.D., 3/26/2023 6:24 PM

## 2023-03-29 LAB
BACTERIA UR CULT: NORMAL
SIGNIFICANT IND 70042: NORMAL
SITE SITE: NORMAL
SOURCE SOURCE: NORMAL

## 2023-12-12 ENCOUNTER — OFFICE VISIT (OUTPATIENT)
Dept: URGENT CARE | Facility: CLINIC | Age: 38
End: 2023-12-12
Payer: COMMERCIAL

## 2023-12-12 ENCOUNTER — HOSPITAL ENCOUNTER (OUTPATIENT)
Facility: MEDICAL CENTER | Age: 38
End: 2023-12-12
Attending: NURSE PRACTITIONER
Payer: COMMERCIAL

## 2023-12-12 VITALS
RESPIRATION RATE: 16 BRPM | OXYGEN SATURATION: 99 % | HEART RATE: 87 BPM | DIASTOLIC BLOOD PRESSURE: 80 MMHG | BODY MASS INDEX: 35.5 KG/M2 | WEIGHT: 192.9 LBS | SYSTOLIC BLOOD PRESSURE: 128 MMHG | TEMPERATURE: 97.7 F | HEIGHT: 62 IN

## 2023-12-12 DIAGNOSIS — N76.0 ACUTE VAGINITIS: ICD-10-CM

## 2023-12-12 DIAGNOSIS — R30.9 PAINFUL URINATION: ICD-10-CM

## 2023-12-12 LAB
APPEARANCE UR: NORMAL
BILIRUB UR STRIP-MCNC: NEGATIVE MG/DL
CANDIDA DNA VAG QL PROBE+SIG AMP: NEGATIVE
COLOR UR AUTO: YELLOW
G VAGINALIS DNA VAG QL PROBE+SIG AMP: NEGATIVE
GLUCOSE UR STRIP.AUTO-MCNC: NEGATIVE MG/DL
KETONES UR STRIP.AUTO-MCNC: NEGATIVE MG/DL
LEUKOCYTE ESTERASE UR QL STRIP.AUTO: NORMAL
NITRITE UR QL STRIP.AUTO: NEGATIVE
PH UR STRIP.AUTO: 6.5 [PH] (ref 5–8)
POCT INT CON NEG: NEGATIVE
POCT INT CON POS: POSITIVE
POCT URINE PREGNANCY TEST: NEGATIVE
PROT UR QL STRIP: NEGATIVE MG/DL
RBC UR QL AUTO: NORMAL
SP GR UR STRIP.AUTO: 1.02
T VAGINALIS DNA VAG QL PROBE+SIG AMP: POSITIVE
UROBILINOGEN UR STRIP-MCNC: NORMAL MG/DL

## 2023-12-12 PROCEDURE — 87077 CULTURE AEROBIC IDENTIFY: CPT

## 2023-12-12 PROCEDURE — 87510 GARDNER VAG DNA DIR PROBE: CPT

## 2023-12-12 PROCEDURE — 81002 URINALYSIS NONAUTO W/O SCOPE: CPT | Performed by: NURSE PRACTITIONER

## 2023-12-12 PROCEDURE — 81025 URINE PREGNANCY TEST: CPT | Performed by: NURSE PRACTITIONER

## 2023-12-12 PROCEDURE — 3079F DIAST BP 80-89 MM HG: CPT | Performed by: NURSE PRACTITIONER

## 2023-12-12 PROCEDURE — 87480 CANDIDA DNA DIR PROBE: CPT

## 2023-12-12 PROCEDURE — 87660 TRICHOMONAS VAGIN DIR PROBE: CPT

## 2023-12-12 PROCEDURE — 3074F SYST BP LT 130 MM HG: CPT | Performed by: NURSE PRACTITIONER

## 2023-12-12 PROCEDURE — 87491 CHLMYD TRACH DNA AMP PROBE: CPT

## 2023-12-12 PROCEDURE — 99203 OFFICE O/P NEW LOW 30 MIN: CPT | Performed by: NURSE PRACTITIONER

## 2023-12-12 PROCEDURE — 87591 N.GONORRHOEAE DNA AMP PROB: CPT

## 2023-12-12 PROCEDURE — 87086 URINE CULTURE/COLONY COUNT: CPT

## 2023-12-12 RX ORDER — FLUCONAZOLE 150 MG/1
150 TABLET ORAL DAILY
Qty: 2 TABLET | Refills: 0 | Status: SHIPPED | OUTPATIENT
Start: 2023-12-12

## 2023-12-12 ASSESSMENT — FIBROSIS 4 INDEX: FIB4 SCORE: 0.72

## 2023-12-12 NOTE — PROGRESS NOTES
Chief Complaint   Patient presents with    UTI     The patient stated she may have a UTI, but is also having swelling on the labia, discharge and pain during intercourse.       HISTORY OF PRESENT ILLNESS: Patient is a pleasant 38 y.o. female who presents to urgent care today with urinary and vaginal concerns for the past three weeks. She endorses pain after urination as well as vaginal itching and discharge. Denies fever, chills, urine urgency/frequency, sores or lesions. She has tried cream for treatment.     There are no problems to display for this patient.      Allergies:Hydrocodone-acetaminophen    Current Outpatient Medications Ordered in Epic   Medication Sig Dispense Refill    fluconazole (DIFLUCAN) 150 MG tablet Take 1 Tablet by mouth every day. May repeat dose in 72 hours if still having symptoms. 2 Tablet 0     No current Epic-ordered facility-administered medications on file.       Past Medical History:   Diagnosis Date    Anxiety     Depression     PP depression in 2008    Hypertension     2017    Kidney disease     stones in 2008 and 2010    Thyroid disease     Dx  with HAshimotos thyroid in 5246-0261, stopped meds 6 mnths ago       Social History     Tobacco Use    Smoking status: Never    Smokeless tobacco: Never   Vaping Use    Vaping Use: Never used   Substance Use Topics    Alcohol use: No    Drug use: Not Currently       Family Status   Relation Name Status    Mo  Alive    Fa  Alive    Sis  Alive    Bro 3 Alive     Family History   Problem Relation Age of Onset    Alcohol/Drug Mother         alcoholic    Cancer Father         remission of throat cancer for 4 yrs       ROS:  Review of Systems   Constitutional: Negative for fever, chills, weight loss, malaise, and fatigue.   HENT: Negative for ear pain, nosebleeds, congestion, sore throat and neck pain.    Eyes: Negative for vision changes.   Neuro: Negative for headache, sensory changes, weakness, seizure, LOC.   Cardiovascular: Negative for chest  "pain, palpitations, orthopnea and leg swelling.   Respiratory: Negative for cough, sputum production, shortness of breath and wheezing.   Gastrointestinal: Negative for abdominal pain, nausea, vomiting or diarrhea.   Genitourinary: Positive for dysuria. Negative for urgency and frequency.  GYN: Positive for vaginal itching, discharge.  Musculoskeletal: Negative for falls, neck pain, back pain, joint pain, myalgias.   Skin: Negative for rash, diaphoresis.     Exam:  /80 (BP Location: Left arm, Patient Position: Sitting, BP Cuff Size: Adult)   Pulse 87   Temp 36.5 °C (97.7 °F) (Temporal)   Resp 16   Ht 1.562 m (5' 1.5\")   Wt 87.5 kg (192 lb 14.4 oz)   SpO2 99%   General: well-nourished, well-developed female in NAD  Head: normocephalic, atraumatic  Eyes: PERRLA, no conjunctival injection, acuity grossly intact, lids normal.  Ears: normal shape and symmetry, no tenderness, no discharge. External canals are without any significant edema or erythema. Tympanic membranes are without any inflammation, no effusion. Gross auditory acuity is intact.  Nose: symmetrical without tenderness, no discharge.  Mouth/Throat: reasonable hygiene, no erythema, exudates or tonsillar enlargement.  Neck: no masses, range of motion within normal limits, no tracheal deviation. No obvious thyroid enlargement.   Lymph: no cervical adenopathy. No supraclavicular adenopathy.   Neuro: alert and oriented. Cranial nerves 1-12 grossly intact. No sensory deficit.   Cardiovascular: regular rate and rhythm. No edema.  Pulmonary: no distress. Chest is symmetrical with respiration, no wheezes, crackles, or rhonchi.   Abdomen: soft, non-tender, no guarding, no hepatosplenomegaly.  GYN: Exam deferred  Musculoskeletal: no clubbing, appropriate muscle tone, gait is stable.  Skin: warm, dry, intact, no clubbing, no cyanosis, no rashes.   Psych: appropriate mood, affect, judgement.         Assessment/Plan:  1. Acute vaginitis  fluconazole (DIFLUCAN) " 150 MG tablet      2. Painful urination  POCT Urinalysis    POCT Pregnancy    VAGINAL PATHOGENS DNA PANEL    Chlamydia/GC, PCR (Urine)    URINE CULTURE(NEW)          Patient presents with both urinary and vaginal complaints.  Suspect vaginitis, Diflucan as directed.  Urine, pathogens, GC sent for further testing.  Supportive care, differential diagnoses, and indications for immediate follow-up discussed with patient.   Pathogenesis of diagnosis discussed including typical length and natural progression.   Instructed to return to clinic or nearest emergency department for any change in condition, further concerns, or worsening of symptoms.  Patient states understanding of the plan of care and discharge instructions.  Instructed to make an appointment, for follow up, with her primary care provider.        Please note that this dictation was created using voice recognition software. I have made every reasonable attempt to correct obvious errors, but I expect that there are errors of grammar and possibly content that I did not discover before finalizing the note.      SRIKANTH Gomez.

## 2023-12-13 LAB
C TRACH DNA GENITAL QL NAA+PROBE: NEGATIVE
N GONORRHOEA DNA GENITAL QL NAA+PROBE: NEGATIVE
SPECIMEN SOURCE: NORMAL

## 2023-12-14 DIAGNOSIS — A59.9 TRICHIMONIASIS: ICD-10-CM

## 2023-12-14 DIAGNOSIS — N39.0 GROUP B STREPTOCOCCAL UTI: ICD-10-CM

## 2023-12-14 DIAGNOSIS — B95.1 GROUP B STREPTOCOCCAL UTI: ICD-10-CM

## 2023-12-14 RX ORDER — METRONIDAZOLE 500 MG/1
500 TABLET ORAL 2 TIMES DAILY
Qty: 14 TABLET | Refills: 0 | Status: SHIPPED | OUTPATIENT
Start: 2023-12-14 | End: 2023-12-21

## 2023-12-14 RX ORDER — CEFDINIR 300 MG/1
300 CAPSULE ORAL 2 TIMES DAILY
Qty: 14 CAPSULE | Refills: 0 | Status: SHIPPED | OUTPATIENT
Start: 2023-12-14 | End: 2023-12-21

## 2023-12-15 NOTE — PROGRESS NOTES
1. Trichimoniasis    - metroNIDAZOLE (FLAGYL) 500 MG Tab; Take 1 Tablet by mouth 2 times a day for 7 days.  Dispense: 14 Tablet; Refill: 0    2. Group B streptococcal UTI    - cefdinir (OMNICEF) 300 MG Cap; Take 1 Capsule by mouth 2 times a day for 7 days.  Dispense: 14 Capsule; Refill: 0      Patient presents urgent care requesting treatment for lab test results that of come in.  Patient's vaginal pathogen swab was positive for trichomoniasis so she is sent with Flagyl.  Additionally she has a preliminary urine culture that shows group B strep.  She is currently taking Diflucan.  She is sent with Omnicef.

## 2023-12-25 ENCOUNTER — OFFICE VISIT (OUTPATIENT)
Dept: URGENT CARE | Facility: CLINIC | Age: 38
End: 2023-12-25
Payer: COMMERCIAL

## 2023-12-25 VITALS
BODY MASS INDEX: 36.25 KG/M2 | TEMPERATURE: 97.6 F | WEIGHT: 192 LBS | SYSTOLIC BLOOD PRESSURE: 136 MMHG | RESPIRATION RATE: 18 BRPM | OXYGEN SATURATION: 98 % | DIASTOLIC BLOOD PRESSURE: 90 MMHG | HEIGHT: 61 IN | HEART RATE: 85 BPM

## 2023-12-25 DIAGNOSIS — R07.9 CHEST PAIN AT REST: ICD-10-CM

## 2023-12-25 DIAGNOSIS — M79.10 MYALGIA: ICD-10-CM

## 2023-12-25 PROCEDURE — 3075F SYST BP GE 130 - 139MM HG: CPT | Performed by: NURSE PRACTITIONER

## 2023-12-25 PROCEDURE — 3080F DIAST BP >= 90 MM HG: CPT | Performed by: NURSE PRACTITIONER

## 2023-12-25 PROCEDURE — 99213 OFFICE O/P EST LOW 20 MIN: CPT | Performed by: NURSE PRACTITIONER

## 2023-12-25 PROCEDURE — 93010 ELECTROCARDIOGRAM REPORT: CPT | Performed by: NURSE PRACTITIONER

## 2023-12-25 RX ORDER — IBUPROFEN 600 MG/1
600 TABLET ORAL EVERY 8 HOURS PRN
Qty: 90 TABLET | Refills: 0 | Status: SHIPPED | OUTPATIENT
Start: 2023-12-25

## 2023-12-25 ASSESSMENT — ENCOUNTER SYMPTOMS
DIZZINESS: 0
CHILLS: 0
WHEEZING: 0
BACK PAIN: 1
ORTHOPNEA: 0
SHORTNESS OF BREATH: 0
NAUSEA: 0
DIAPHORESIS: 0
FEVER: 0
COUGH: 0
HEADACHES: 0

## 2023-12-25 ASSESSMENT — FIBROSIS 4 INDEX: FIB4 SCORE: 0.72

## 2023-12-25 NOTE — PROGRESS NOTES
Subjective     Taylor Hermosillo is a 38 y.o. female who presents with Chest Pain (R sided, radiates to back, SOB, x this morning )            HPI  New problem.  Patient is a 38-year-old female who presents with right-sided chest pain that radiates to her back that started approximately 4:00 this morning and woke her up from a dead sleep.  She denies any nausea, diaphoresis but does report some mild shortness of breath.  She states it feels most muscular in nature.  She denies abdominal pain.  She has taken ibuprofen for this with minimal relief.  The chest pain today here in the clinic has subsided however she still has some mild discomfort.    Hydrocodone-acetaminophen  Current Outpatient Medications on File Prior to Visit   Medication Sig Dispense Refill    fluconazole (DIFLUCAN) 150 MG tablet Take 1 Tablet by mouth every day. May repeat dose in 72 hours if still having symptoms. (Patient not taking: Reported on 12/25/2023) 2 Tablet 0     No current facility-administered medications on file prior to visit.     Social History     Socioeconomic History    Marital status: Single     Spouse name: Not on file    Number of children: Not on file    Years of education: Not on file    Highest education level: Not on file   Occupational History    Not on file   Tobacco Use    Smoking status: Never    Smokeless tobacco: Never   Vaping Use    Vaping Use: Never used   Substance and Sexual Activity    Alcohol use: No    Drug use: Not Currently    Sexual activity: Yes     Partners: Male     Birth control/protection: I.U.D.   Other Topics Concern    Not on file   Social History Narrative    Not on file     Social Determinants of Health     Financial Resource Strain: Not on file   Food Insecurity: Not on file   Transportation Needs: Not on file   Physical Activity: Not on file   Stress: Not on file   Social Connections: Not on file   Intimate Partner Violence: Not on file   Housing Stability: Not on file     Breast Cancer-related  "family history is not on file.      Review of Systems   Constitutional:  Negative for chills, diaphoresis, fever and malaise/fatigue.   Respiratory:  Negative for cough, shortness of breath and wheezing.    Cardiovascular:  Positive for chest pain. Negative for orthopnea and leg swelling.   Gastrointestinal:  Negative for nausea.   Musculoskeletal:  Positive for back pain.   Neurological:  Negative for dizziness and headaches.              Objective     BP (!) 136/90   Pulse 85   Temp 36.4 °C (97.6 °F)   Resp 18   Ht 1.549 m (5' 1\")   Wt 87.1 kg (192 lb)   SpO2 98%   BMI 36.28 kg/m²      Physical Exam  Vitals and nursing note reviewed.   Constitutional:       Appearance: Normal appearance.   Cardiovascular:      Rate and Rhythm: Normal rate and regular rhythm.      Heart sounds: No murmur heard.  Pulmonary:      Effort: Pulmonary effort is normal.      Breath sounds: Normal breath sounds.   Musculoskeletal:         General: Tenderness present. Normal range of motion.      Thoracic back: Tenderness present.        Back:    Skin:     General: Skin is warm and dry.      Capillary Refill: Capillary refill takes less than 2 seconds.   Neurological:      General: No focal deficit present.      Mental Status: She is alert and oriented to person, place, and time.   Psychiatric:         Mood and Affect: Mood normal.                             Assessment & Plan        1. Myalgia  ibuprofen (MOTRIN) 600 MG Tab      2. Chest pain at rest  EKG - Clinic Performed        EKG with NSR, borderline Twave abnormalities. No ectopy noted.  Reassurance.  Likely muscle pain-ibuprofen.  Strict ED precautions given.              "

## 2024-01-03 ENCOUNTER — APPOINTMENT (OUTPATIENT)
Dept: RADIOLOGY | Facility: MEDICAL CENTER | Age: 39
End: 2024-01-03
Attending: EMERGENCY MEDICINE
Payer: COMMERCIAL

## 2024-01-03 ENCOUNTER — HOSPITAL ENCOUNTER (EMERGENCY)
Facility: MEDICAL CENTER | Age: 39
End: 2024-01-03
Attending: EMERGENCY MEDICINE
Payer: COMMERCIAL

## 2024-01-03 VITALS
BODY MASS INDEX: 40.42 KG/M2 | HEIGHT: 61 IN | OXYGEN SATURATION: 98 % | HEART RATE: 77 BPM | TEMPERATURE: 97.1 F | SYSTOLIC BLOOD PRESSURE: 134 MMHG | DIASTOLIC BLOOD PRESSURE: 68 MMHG | RESPIRATION RATE: 17 BRPM | WEIGHT: 214.07 LBS

## 2024-01-03 DIAGNOSIS — R74.8 ELEVATED LIPASE: ICD-10-CM

## 2024-01-03 DIAGNOSIS — R10.9 ABDOMINAL PAIN, UNSPECIFIED ABDOMINAL LOCATION: ICD-10-CM

## 2024-01-03 LAB
ALBUMIN SERPL BCP-MCNC: 4.2 G/DL (ref 3.2–4.9)
ALBUMIN/GLOB SERPL: 1.5 G/DL
ALP SERPL-CCNC: 75 U/L (ref 30–99)
ALT SERPL-CCNC: 25 U/L (ref 2–50)
ANION GAP SERPL CALC-SCNC: 10 MMOL/L (ref 7–16)
APPEARANCE UR: CLEAR
AST SERPL-CCNC: 20 U/L (ref 12–45)
BASOPHILS # BLD AUTO: 0.8 % (ref 0–1.8)
BASOPHILS # BLD: 0.05 K/UL (ref 0–0.12)
BILIRUB SERPL-MCNC: 0.4 MG/DL (ref 0.1–1.5)
BILIRUB UR QL STRIP.AUTO: NEGATIVE
BUN SERPL-MCNC: 13 MG/DL (ref 8–22)
CALCIUM ALBUM COR SERPL-MCNC: 8.8 MG/DL (ref 8.5–10.5)
CALCIUM SERPL-MCNC: 9 MG/DL (ref 8.5–10.5)
CHLORIDE SERPL-SCNC: 105 MMOL/L (ref 96–112)
CO2 SERPL-SCNC: 23 MMOL/L (ref 20–33)
COLOR UR: YELLOW
CREAT SERPL-MCNC: 0.57 MG/DL (ref 0.5–1.4)
EOSINOPHIL # BLD AUTO: 0.19 K/UL (ref 0–0.51)
EOSINOPHIL NFR BLD: 3.2 % (ref 0–6.9)
ERYTHROCYTE [DISTWIDTH] IN BLOOD BY AUTOMATED COUNT: 41.3 FL (ref 35.9–50)
GFR SERPLBLD CREATININE-BSD FMLA CKD-EPI: 117 ML/MIN/1.73 M 2
GLOBULIN SER CALC-MCNC: 2.8 G/DL (ref 1.9–3.5)
GLUCOSE SERPL-MCNC: 96 MG/DL (ref 65–99)
GLUCOSE UR STRIP.AUTO-MCNC: NEGATIVE MG/DL
HCG SERPL QL: NEGATIVE
HCT VFR BLD AUTO: 41.7 % (ref 37–47)
HGB BLD-MCNC: 14.1 G/DL (ref 12–16)
IMM GRANULOCYTES # BLD AUTO: 0.03 K/UL (ref 0–0.11)
IMM GRANULOCYTES NFR BLD AUTO: 0.5 % (ref 0–0.9)
KETONES UR STRIP.AUTO-MCNC: NEGATIVE MG/DL
LEUKOCYTE ESTERASE UR QL STRIP.AUTO: NEGATIVE
LIPASE SERPL-CCNC: 84 U/L (ref 11–82)
LYMPHOCYTES # BLD AUTO: 1.79 K/UL (ref 1–4.8)
LYMPHOCYTES NFR BLD: 29.8 % (ref 22–41)
MCH RBC QN AUTO: 29.4 PG (ref 27–33)
MCHC RBC AUTO-ENTMCNC: 33.8 G/DL (ref 32.2–35.5)
MCV RBC AUTO: 86.9 FL (ref 81.4–97.8)
MICRO URNS: NORMAL
MONOCYTES # BLD AUTO: 0.67 K/UL (ref 0–0.85)
MONOCYTES NFR BLD AUTO: 11.1 % (ref 0–13.4)
NEUTROPHILS # BLD AUTO: 3.28 K/UL (ref 1.82–7.42)
NEUTROPHILS NFR BLD: 54.6 % (ref 44–72)
NITRITE UR QL STRIP.AUTO: NEGATIVE
NRBC # BLD AUTO: 0 K/UL
NRBC BLD-RTO: 0 /100 WBC (ref 0–0.2)
PH UR STRIP.AUTO: 6.5 [PH] (ref 5–8)
PLATELET # BLD AUTO: 276 K/UL (ref 164–446)
PMV BLD AUTO: 10.8 FL (ref 9–12.9)
POTASSIUM SERPL-SCNC: 4.3 MMOL/L (ref 3.6–5.5)
PROT SERPL-MCNC: 7 G/DL (ref 6–8.2)
PROT UR QL STRIP: NEGATIVE MG/DL
RBC # BLD AUTO: 4.8 M/UL (ref 4.2–5.4)
RBC UR QL AUTO: NEGATIVE
SODIUM SERPL-SCNC: 138 MMOL/L (ref 135–145)
SP GR UR STRIP.AUTO: 1.01
UROBILINOGEN UR STRIP.AUTO-MCNC: 0.2 MG/DL
WBC # BLD AUTO: 6 K/UL (ref 4.8–10.8)

## 2024-01-03 PROCEDURE — 700117 HCHG RX CONTRAST REV CODE 255: Mod: UD | Performed by: EMERGENCY MEDICINE

## 2024-01-03 PROCEDURE — 84703 CHORIONIC GONADOTROPIN ASSAY: CPT

## 2024-01-03 PROCEDURE — 81003 URINALYSIS AUTO W/O SCOPE: CPT

## 2024-01-03 PROCEDURE — 85025 COMPLETE CBC W/AUTO DIFF WBC: CPT

## 2024-01-03 PROCEDURE — 700102 HCHG RX REV CODE 250 W/ 637 OVERRIDE(OP): Mod: UD | Performed by: EMERGENCY MEDICINE

## 2024-01-03 PROCEDURE — 99284 EMERGENCY DEPT VISIT MOD MDM: CPT

## 2024-01-03 PROCEDURE — A9270 NON-COVERED ITEM OR SERVICE: HCPCS | Mod: UD | Performed by: EMERGENCY MEDICINE

## 2024-01-03 PROCEDURE — 74177 CT ABD & PELVIS W/CONTRAST: CPT

## 2024-01-03 PROCEDURE — 80053 COMPREHEN METABOLIC PANEL: CPT

## 2024-01-03 PROCEDURE — 36415 COLL VENOUS BLD VENIPUNCTURE: CPT

## 2024-01-03 PROCEDURE — 83690 ASSAY OF LIPASE: CPT

## 2024-01-03 RX ORDER — ACETAMINOPHEN 500 MG
1000 TABLET ORAL ONCE
Status: COMPLETED | OUTPATIENT
Start: 2024-01-03 | End: 2024-01-03

## 2024-01-03 RX ADMIN — ACETAMINOPHEN 1000 MG: 500 TABLET ORAL at 14:18

## 2024-01-03 RX ADMIN — IOHEXOL 100 ML: 350 INJECTION, SOLUTION INTRAVENOUS at 14:00

## 2024-01-03 ASSESSMENT — PAIN DESCRIPTION - PAIN TYPE
TYPE: ACUTE PAIN
TYPE: ACUTE PAIN

## 2024-01-03 ASSESSMENT — FIBROSIS 4 INDEX: FIB4 SCORE: 0.72

## 2024-01-03 NOTE — ED PROVIDER NOTES
ED Provider Note    CHIEF COMPLAINT  Chief Complaint   Patient presents with    Abdominal Pain     Stabbing RLQ pain x 2 days. -N/V/D.       HPI/ROS    OUTSIDE HISTORIAN(S):  Family patient's daughter is at bedside    Taylor Hermosillo is a 38 y.o. female who presents with complaint of abdominal pain.  She states she started stabbings right lower quadrant abdominal pain for 2 days.  The pain increases with any type of movement decreased with rest, no ration to her back, to her groin.  She denies vaginal bleeding, vaginal discharge, dysuria, hematochezia, melena, fever, nausea, vomiting or diarrhea.  States she is only had 2 C-sections the past no other surgical intervention in her abdomen.    PAST MEDICAL HISTORY   has a past medical history of Anxiety, Depression, Hypertension, Kidney disease, and Thyroid disease.    SURGICAL HISTORY   has a past surgical history that includes dental extraction(s); primary c section (01/27/2005); and repeat c section (11/30/2008).    FAMILY HISTORY  Family History   Problem Relation Age of Onset    Alcohol/Drug Mother         alcoholic    Cancer Father         remission of throat cancer for 4 yrs       SOCIAL HISTORY  Social History     Tobacco Use    Smoking status: Never    Smokeless tobacco: Never   Vaping Use    Vaping Use: Never used   Substance and Sexual Activity    Alcohol use: No    Drug use: Not Currently    Sexual activity: Yes     Partners: Male     Birth control/protection: I.U.D.       CURRENT MEDICATIONS  Home Medications       Reviewed by Eileen Heck R.N. (Registered Nurse) on 01/03/24 at 1113  Med List Status: Not Addressed     Medication Last Dose Status   fluconazole (DIFLUCAN) 150 MG tablet  Active   ibuprofen (MOTRIN) 600 MG Tab  Active                    ALLERGIES  Allergies   Allergen Reactions    Hydrocodone-Acetaminophen Itching       PHYSICAL EXAM  VITAL SIGNS: /68   Pulse 77   Temp 36.2 °C (97.1 °F) (Temporal)   Resp 17   Ht 1.549 m (5'  "1\")   Wt 97.1 kg (214 lb 1.1 oz)   SpO2 98%   BMI 40.45 kg/m²      Nursing notes and vitals reviewed.  Constitutional: Well developed, Well nourished, No acute distress, Non-toxic appearance.   Eyes: PERRLA, EOMI, Conjunctiva normal, No discharge.   Cardiovascular: Normal heart rate, Normal rhythm, No murmurs, No rubs, No gallops.   Thorax & Lungs: No respiratory distress, No rales, No rhonchi, No wheezing, No chest tenderness.   Abdomen: Periumbilical and right lower quadrant tenderness, slight guarding, negative Rosvig sign, negative Barton sign  Skin: Warm, Dry, No erythema, No rash.           DIAGNOSTIC STUDIES / PROCEDURES  EKG  I have independently interpreted this EKG  Normal sinus rhythm on monitor    LABS  Results for orders placed or performed during the hospital encounter of 01/03/24   CBC WITH DIFFERENTIAL   Result Value Ref Range    WBC 6.0 4.8 - 10.8 K/uL    RBC 4.80 4.20 - 5.40 M/uL    Hemoglobin 14.1 12.0 - 16.0 g/dL    Hematocrit 41.7 37.0 - 47.0 %    MCV 86.9 81.4 - 97.8 fL    MCH 29.4 27.0 - 33.0 pg    MCHC 33.8 32.2 - 35.5 g/dL    RDW 41.3 35.9 - 50.0 fL    Platelet Count 276 164 - 446 K/uL    MPV 10.8 9.0 - 12.9 fL    Neutrophils-Polys 54.60 44.00 - 72.00 %    Lymphocytes 29.80 22.00 - 41.00 %    Monocytes 11.10 0.00 - 13.40 %    Eosinophils 3.20 0.00 - 6.90 %    Basophils 0.80 0.00 - 1.80 %    Immature Granulocytes 0.50 0.00 - 0.90 %    Nucleated RBC 0.00 0.00 - 0.20 /100 WBC    Neutrophils (Absolute) 3.28 1.82 - 7.42 K/uL    Lymphs (Absolute) 1.79 1.00 - 4.80 K/uL    Monos (Absolute) 0.67 0.00 - 0.85 K/uL    Eos (Absolute) 0.19 0.00 - 0.51 K/uL    Baso (Absolute) 0.05 0.00 - 0.12 K/uL    Immature Granulocytes (abs) 0.03 0.00 - 0.11 K/uL    NRBC (Absolute) 0.00 K/uL   COMP METABOLIC PANEL   Result Value Ref Range    Sodium 138 135 - 145 mmol/L    Potassium 4.3 3.6 - 5.5 mmol/L    Chloride 105 96 - 112 mmol/L    Co2 23 20 - 33 mmol/L    Anion Gap 10.0 7.0 - 16.0    Glucose 96 65 - 99 mg/dL    " Bun 13 8 - 22 mg/dL    Creatinine 0.57 0.50 - 1.40 mg/dL    Calcium 9.0 8.5 - 10.5 mg/dL    Correct Calcium 8.8 8.5 - 10.5 mg/dL    AST(SGOT) 20 12 - 45 U/L    ALT(SGPT) 25 2 - 50 U/L    Alkaline Phosphatase 75 30 - 99 U/L    Total Bilirubin 0.4 0.1 - 1.5 mg/dL    Albumin 4.2 3.2 - 4.9 g/dL    Total Protein 7.0 6.0 - 8.2 g/dL    Globulin 2.8 1.9 - 3.5 g/dL    A-G Ratio 1.5 g/dL   LIPASE   Result Value Ref Range    Lipase 84 (H) 11 - 82 U/L   HCG QUAL SERUM   Result Value Ref Range    Beta-Hcg Qualitative Serum Negative Negative   URINALYSIS,CULTURE IF INDICATED    Specimen: Urine, Clean Catch   Result Value Ref Range    Color Yellow     Character Clear     Specific Gravity 1.015 <1.035    Ph 6.5 5.0 - 8.0    Glucose Negative Negative mg/dL    Ketones Negative Negative mg/dL    Protein Negative Negative mg/dL    Bilirubin Negative Negative    Urobilinogen, Urine 0.2 Negative    Nitrite Negative Negative    Leukocyte Esterase Negative Negative    Occult Blood Negative Negative    Micro Urine Req see below    ESTIMATED GFR   Result Value Ref Range    GFR (CKD-EPI) 117 >60 mL/min/1.73 m 2         RADIOLOGY  I have independently interpreted the diagnostic imaging associated with this visit and am waiting the final reading from the radiologist.   My preliminary interpretation is as follows: Negative for appendicitis, diverticulitis, small bowel obstruction  Radiologist interpretation:   CT-ABDOMEN-PELVIS WITH   Final Result      1.  No acute abnormality of the abdomen or pelvis.   2.  Normal-appearing appendix.   3.  Nonobstructive left renal calculi.   4.  Intrauterine device.            COURSE & MEDICAL DECISION MAKING    ED Observation Status? No; Patient does not meet criteria for ED Observation.     INITIAL ASSESSMENT, COURSE AND PLAN  Care Narrative: This is a pleasant 38-year-old female presents with abdominal pain.  CT scan was ordered secondary to the fact it was concerned she had appendicitis, diverticulitis,  small bowel obstruction.  She did decline pain medications currently.  Repeat evaluation after CT scan, patient a negative CT scan for acute intra-abdominal abnormality such as cholecystitis, diverticulitis, appendicitis, small bowel obstruction.  She is afebrile, she does not have a leukocytosis and do not believe she has a significant infectious etiology.  She does not have a catastrophic or life-threatening condition here in the emergency department.  She received Tylenol orally for pain control.  She did have a slight elevated lipase but no significant tenderness left upper quadrant, she has had no nausea no vomiting do not believe she has acute pancreatitis.  She is to follow-up with her primary care physician or to the emergency department for this reason.    Reevaluation prior to discharge at 1410, she had a soft, nonsurgical abdomen, positive p.o.  DISPOSITION AND DISCUSSIONS      Escalation of care considered, and ultimately not performed:acute inpatient care management, however at this time, the patient is most appropriate for outpatient management      Decision tools and prescription drugs considered including, but not limited to: Although at this point I do not believe the patient has an acute intra-abdominal process that requires emergent surgical intervention there is a possibility that the patient is experiencing an early infection that is in evolution that may require further evaluation and possible surgical consultation. Therefore, strict return precautions have been given to return to the emergency department within 24 hours if the patient has any remaining abdominal pain and to return sooner for increasing pain, uncontrolled nausea or vomiting, fevers or change in symptoms. The patient will followup with their primary care physician within 3 days for re-evaluation.     FINAL DIAGNOSIS  1. Abdominal pain, unspecified abdominal location    2. Elevated lipase      DISPOSITION:  Patient will be  discharged home in stable condition.    FOLLOW UP:  [facility] Emergency Dept  [address] [address] [phone]    If symptoms worsen      OUTPATIENT MEDICATIONS:  Discharge Medication List as of 1/3/2024  2:30 PM               Electronically signed by: Samuel Beasley D.O., 1/3/2024 1:00 PM

## 2024-01-03 NOTE — Clinical Note
Taylor Hermosillo was seen and treated in our emergency department on 1/3/2024.  She may return to work on 01/04/2024.  I recommend light duty for 5 days or until 9 January.     If you have any questions or concerns, please don't hesitate to call.      Samuel Beasley D.O.

## 2024-01-03 NOTE — ED TRIAGE NOTES
"Chief Complaint   Patient presents with    Abdominal Pain     Stabbing RLQ pain x 2 days. -N/V/D.     Pt has all organs. Denies etoh use. Reports increased stressors at home. Pt amb with steady gait in triage. Protocol ordered. Pt educated to inform staff of any changes.    /75   Pulse 70   Temp 36.2 °C (97.1 °F) (Temporal)   Resp 16   Ht 1.549 m (5' 1\")   Wt 97.1 kg (214 lb 1.1 oz)   SpO2 99%   BMI 40.45 kg/m²     "

## 2024-01-03 NOTE — DISCHARGE INSTRUCTIONS
Your lipase is slight elevated here today.  Return to the emerged part your severe abdominal pain especially left upper quadrant, profound nausea or vomiting.    Abdominal Pain, Extended Version   Belly Pain, Stomach Pain    Take clear fluid diet 12 hours and slowly advance to solid food as tolerated. Take  Ibuprofen or Tylenol for pain as needed. Return to the emergency department in 24 hours for reevaluation if you continue to have abdominal. Return sooner if you have worsening pain (especially in the lower right abdomen), pain that is worse with movement, uncontrolled vomiting, new fever, bleeding or ill appearance as you may have a surgical condition in evolution that require further evaluation and consultation.    Your exam may not have shown the exact reason you have abdominal pain.  Since there are many different causes of abdominal pain, another checkup and more tests is required in 24 hours if your symptoms do not improve. One of the many possible causes of abdominal pain in any person who has not had their appendix removed is Acute Appendicitis.  Appendicitis is often a very difficult to diagnosis. Normal blood tests, urine tests, and even ultrasound and CT can not ensure there is not an early appendicitis. Sometimes only the changes which occur over time will allow appendicitis and other causes of abdominal pain to be determined.  Because of this, it is important you follow all of the instructions below.                                                                                                Home care instructions  Rest.  Do not eat solid food until your pain is gone.  While You Have Pain:  Stay on a clear liquid diet.  A clear liquid is one you can see through (water, weak tea, broth or bouillon, ginger ale, Jell-o, Prasanna-Aid, Gatorade, apple juice, popsicles or ice chips).   When Your Pain is Gone:  Start a light diet (dry toast, crackers, applesauce, white rice, bananas, broth or bouillon) and  increase the diet slowly, as long as it does not bother you.  No dairy products (including cheese and eggs) and no spicy, fatty, fried or high fiber foods.  No alcohol, caffeine or cigarettes.  Take your regular medicines unless the caregiver told you not to.  Take any prescribed medicine as directed.  Do not take medicine containing aspirin, ibuprofen (Advil® / Motrin® ), naprosyn/naproxen (Aleve®) or ketoprofen (Orudis® ) unless told to by your own caregiver.    seek immediate medical attention if :  Your pain is not gone in 8-12 hours.  Your pain becomes worse, changes location or feels different.  You have a fever or shaking chills.  You keep throwing up or cannot drink liquids.   You see blood when you throw up or see blood in your bowel movements.    Your bowel movements become dark or black.  You move your bowels frequently.  Your bowel movements stop (become blocked) or you cannot pass gas.  You have bloody, frequent or painful urination (“passing water”).  Your skin or the whites of your eyes look yellow.  Your stomach becomes bloated or bigger.  You notice bleeding or discharge from your vagina.  You have dizziness or fainting.  You have chest or back pain.   Anything else worries you.  You become short of breath.    If you have questions or concerns, please call your caregiver.     Adapted from ©2001  [employer] Aftercare Instruction Sheets  Last reviewed July 12, 2005, [employer], creator of [employer] Patient Information System.    [employer]® Patient Information ©2007 [employer].

## 2024-03-19 ENCOUNTER — OFFICE VISIT (OUTPATIENT)
Dept: URGENT CARE | Facility: CLINIC | Age: 39
End: 2024-03-19
Payer: COMMERCIAL

## 2024-03-19 VITALS
HEIGHT: 61 IN | WEIGHT: 214 LBS | HEART RATE: 80 BPM | SYSTOLIC BLOOD PRESSURE: 110 MMHG | BODY MASS INDEX: 40.4 KG/M2 | RESPIRATION RATE: 21 BRPM | TEMPERATURE: 97.7 F | OXYGEN SATURATION: 95 % | DIASTOLIC BLOOD PRESSURE: 68 MMHG

## 2024-03-19 DIAGNOSIS — J01.40 ACUTE NON-RECURRENT PANSINUSITIS: ICD-10-CM

## 2024-03-19 PROCEDURE — 3074F SYST BP LT 130 MM HG: CPT | Performed by: PHYSICIAN ASSISTANT

## 2024-03-19 PROCEDURE — 3078F DIAST BP <80 MM HG: CPT | Performed by: PHYSICIAN ASSISTANT

## 2024-03-19 PROCEDURE — 99213 OFFICE O/P EST LOW 20 MIN: CPT | Performed by: PHYSICIAN ASSISTANT

## 2024-03-19 RX ORDER — FLUTICASONE PROPIONATE 50 MCG
1 SPRAY, SUSPENSION (ML) NASAL DAILY
Qty: 16 G | Refills: 0 | Status: SHIPPED | OUTPATIENT
Start: 2024-03-19

## 2024-03-19 RX ORDER — AMOXICILLIN AND CLAVULANATE POTASSIUM 875; 125 MG/1; MG/1
1 TABLET, FILM COATED ORAL 2 TIMES DAILY
Qty: 14 TABLET | Refills: 0 | Status: SHIPPED | OUTPATIENT
Start: 2024-03-19 | End: 2024-03-26

## 2024-03-19 ASSESSMENT — ENCOUNTER SYMPTOMS
SHORTNESS OF BREATH: 0
DIZZINESS: 0
HEADACHES: 0
NAUSEA: 0
SINUS PAIN: 1
SORE THROAT: 0
COUGH: 0
DIAPHORESIS: 0
VOMITING: 0
FEVER: 0
EYE DISCHARGE: 0
DIARRHEA: 0
CONSTIPATION: 0
CHILLS: 0
WHEEZING: 0
ABDOMINAL PAIN: 0
EYE PAIN: 0
EYE REDNESS: 0

## 2024-03-19 ASSESSMENT — FIBROSIS 4 INDEX: FIB4 SCORE: 0.58

## 2024-03-19 NOTE — PROGRESS NOTES
"  Subjective:     Taylor Hermosillo  is a 38 y.o. female who presents for Sinus Problem (X4days congestion, sore throat)       She presents today with sinus pain and pressure has been ongoing for the last 4 days.  States that her sinus congestion is worsened at night and in the morning, has not a sore throat occurring each morning but associates this with postnasal drip.  She denies any recent known close sick contacts but does work in a warehouse.  At this time she denies fever/chills/sweats, chest pain, shortness of breath, nausea/vomiting, abdominal pain, diarrhea.       Review of Systems   Constitutional:  Negative for chills, diaphoresis, fever and malaise/fatigue.   HENT:  Positive for congestion and sinus pain. Negative for ear discharge and sore throat.    Eyes:  Negative for pain, discharge and redness.   Respiratory:  Negative for cough, shortness of breath and wheezing.    Cardiovascular:  Negative for chest pain.   Gastrointestinal:  Negative for abdominal pain, constipation, diarrhea, nausea and vomiting.   Neurological:  Negative for dizziness and headaches.      Allergies   Allergen Reactions    Hydrocodone-Acetaminophen Itching     Past Medical History:   Diagnosis Date    Anxiety     Depression     PP depression in 2008    Hypertension     2017    Kidney disease     stones in 2008 and 2010    Thyroid disease     Dx  with HAshimotos thyroid in 2578-1492, stopped meds 6 mnths ago        Objective:   /68   Pulse 80   Temp 36.5 °C (97.7 °F) (Temporal)   Resp (!) 21   Ht 1.549 m (5' 1\")   Wt 97.1 kg (214 lb)   SpO2 95%   BMI 40.43 kg/m²   Physical Exam  Vitals and nursing note reviewed.   Constitutional:       General: She is not in acute distress.     Appearance: Normal appearance. She is not ill-appearing, toxic-appearing or diaphoretic.   HENT:      Head: Normocephalic.      Right Ear: Tympanic membrane, ear canal and external ear normal. There is no impacted cerumen.      Left Ear: " Tympanic membrane, ear canal and external ear normal. There is no impacted cerumen.      Nose: Congestion present. No rhinorrhea.      Mouth/Throat:      Mouth: Mucous membranes are moist.      Pharynx: No oropharyngeal exudate or posterior oropharyngeal erythema.   Eyes:      General:         Right eye: No discharge.         Left eye: No discharge.      Conjunctiva/sclera: Conjunctivae normal.   Cardiovascular:      Rate and Rhythm: Normal rate and regular rhythm.   Pulmonary:      Effort: Pulmonary effort is normal. No respiratory distress.      Breath sounds: Normal breath sounds. No stridor. No wheezing or rhonchi.   Musculoskeletal:      Cervical back: Neck supple.   Lymphadenopathy:      Cervical: No cervical adenopathy.   Neurological:      General: No focal deficit present.      Mental Status: She is alert and oriented to person, place, and time.   Psychiatric:         Mood and Affect: Mood normal.         Behavior: Behavior normal.         Thought Content: Thought content normal.         Judgment: Judgment normal.             Diagnostic testing: None.    Assessment/Plan:     Encounter Diagnoses   Name Primary?    Acute non-recurrent pansinusitis           Plan for care for today's complaint includes starting the patient on Flonase for pansinusitis symptom support, encouraged to use over-the-counter medications for additional symptom relief.  We did discuss that her symptoms at this time are likely a virus in nature but I did provide a contingency antibiotic prescription of Augmentin to be used in the next 5-7 days if symptoms or not improved with the non-antibiotic medications.  Patient did agree to this treatment strategy.  Continue to monitor symptoms and return to urgent care or follow-up with primary care provider if symptoms remain ongoing.  Follow-up in the emergency department if symptoms become severe, ER precautions discussed in office today..  Prescription for Flonase, Augmentin provided.    See  AVS Instructions below for written guidance provided to patient on after-visit management and care in addition to our verbal discussion during the visit.    Please note that this dictation was created using voice recognition software. I have attempted to correct all errors, but there may be sound-alike, spelling, grammar and possibly content errors that I did not discover before finalizing the note.    Watauganimisha Chairez PA-C
